# Patient Record
Sex: MALE | Race: WHITE | Employment: OTHER | URBAN - METROPOLITAN AREA
[De-identification: names, ages, dates, MRNs, and addresses within clinical notes are randomized per-mention and may not be internally consistent; named-entity substitution may affect disease eponyms.]

---

## 2019-03-17 ENCOUNTER — HOSPITAL ENCOUNTER (EMERGENCY)
Age: 74
Discharge: HOME | End: 2019-03-17
Attending: EMERGENCY MEDICINE
Payer: MEDICARE

## 2019-03-17 VITALS
HEART RATE: 61 BPM | DIASTOLIC BLOOD PRESSURE: 90 MMHG | RESPIRATION RATE: 16 BRPM | BODY MASS INDEX: 28.04 KG/M2 | SYSTOLIC BLOOD PRESSURE: 142 MMHG | OXYGEN SATURATION: 100 % | HEIGHT: 72 IN | WEIGHT: 207 LBS | TEMPERATURE: 98.8 F

## 2019-03-17 DIAGNOSIS — Z71.1 WORRIED WELL: Primary | ICD-10-CM

## 2019-03-17 LAB
GLUCOSE BLD-MCNC: 104 MG/DL (ref 70–99)
PERFORMED ON: ABNORMAL

## 2019-03-17 PROCEDURE — 93005 ELECTROCARDIOGRAM TRACING: CPT | Performed by: EMERGENCY MEDICINE

## 2019-03-17 PROCEDURE — 99283 EMERGENCY DEPT VISIT LOW MDM: CPT

## 2019-03-17 ASSESSMENT — ENCOUNTER SYMPTOMS
VOMITING: 0
SHORTNESS OF BREATH: 0
BACK PAIN: 0
RHINORRHEA: 0
DIARRHEA: 0
COUGH: 0
ABDOMINAL PAIN: 0
NAUSEA: 0

## 2019-03-18 LAB
EKG ATRIAL RATE: 57 BPM
EKG DIAGNOSIS: NORMAL
EKG P AXIS: 46 DEGREES
EKG P-R INTERVAL: 196 MS
EKG Q-T INTERVAL: 394 MS
EKG QRS DURATION: 122 MS
EKG QTC CALCULATION (BAZETT): 383 MS
EKG R AXIS: -46 DEGREES
EKG T AXIS: 42 DEGREES
EKG VENTRICULAR RATE: 57 BPM

## 2020-11-25 ENCOUNTER — APPOINTMENT (OUTPATIENT)
Dept: CT IMAGING | Age: 75
End: 2020-11-25
Payer: MEDICARE

## 2020-11-25 ENCOUNTER — APPOINTMENT (OUTPATIENT)
Dept: GENERAL RADIOLOGY | Age: 75
End: 2020-11-25
Payer: MEDICARE

## 2020-11-25 ENCOUNTER — HOSPITAL ENCOUNTER (EMERGENCY)
Age: 75
Discharge: HOME | End: 2020-11-25
Attending: EMERGENCY MEDICINE
Payer: MEDICARE

## 2020-11-25 VITALS
OXYGEN SATURATION: 98 % | DIASTOLIC BLOOD PRESSURE: 72 MMHG | HEART RATE: 72 BPM | TEMPERATURE: 97.4 F | RESPIRATION RATE: 18 BRPM | SYSTOLIC BLOOD PRESSURE: 127 MMHG

## 2020-11-25 PROCEDURE — 99282 EMERGENCY DEPT VISIT SF MDM: CPT

## 2020-11-25 PROCEDURE — 12052 INTMD RPR FACE/MM 2.6-5.0 CM: CPT

## 2020-11-25 PROCEDURE — 70450 CT HEAD/BRAIN W/O DYE: CPT

## 2020-11-25 PROCEDURE — 73560 X-RAY EXAM OF KNEE 1 OR 2: CPT

## 2020-11-25 PROCEDURE — 2500000003 HC RX 250 WO HCPCS: Performed by: EMERGENCY MEDICINE

## 2020-11-25 RX ORDER — LIDOCAINE HYDROCHLORIDE AND EPINEPHRINE 10; 10 MG/ML; UG/ML
20 INJECTION, SOLUTION INFILTRATION; PERINEURAL ONCE
Status: COMPLETED | OUTPATIENT
Start: 2020-11-25 | End: 2020-11-25

## 2020-11-25 RX ADMIN — LIDOCAINE HYDROCHLORIDE,EPINEPHRINE BITARTRATE 20 ML: 10; .01 INJECTION, SOLUTION INFILTRATION; PERINEURAL at 12:50

## 2020-11-25 ASSESSMENT — PAIN SCALES - GENERAL: PAINLEVEL_OUTOF10: 4

## 2020-11-25 NOTE — ED NOTES
Bed: B21-21  Expected date:   Expected time:   Means of arrival:   Comments:  Nora Cole RN  11/25/20 3931

## 2020-11-25 NOTE — ED PROVIDER NOTES
[address] [facility]          ATTENDING PHYSICIAN NOTE       Date of evaluation: 11/25/2020    Chief Complaint     Laceration and Fall      History of Present Illness     Kimmy Malagon is a 66 y.o. male who presents to the emergency department with a complaint of a laceration after a fall. Patient stated that he tripped and fell into the edge of a table or wall. Sustained a laceration above his left eye and also injured his left knee. This occurred immediately prior to arrival he denies any loss of vision or blurry vision out of his bilateral eyes. He denies any recent syncopal episodes. Only complaining of moderate pain in the left forehead and over the left knee that sharp nonradiating otherwise without any complaints at this time. Review of Systems     As documented in the HPI, otherwise all other systems were reviewed and were negative. Past Medical, Surgical, Family, and Social History     He has a past medical history of Aneurysm ([address] Bradycardia, Hypertension, and Thyroid disease. He has a past surgical history that includes Tonsillectomy and adenoidectomy. His family history is not on file. He reports that he has quit smoking. He has quit using smokeless tobacco. He reports current alcohol use of about 7.0 standard drinks of alcohol per week. He reports that he does not use drugs. Medications     Previous Medications    ASPIRIN 81 MG EC TABLET    Take 81 mg by mouth daily. CELECOXIB (CELEBREX) 200 MG CAPSULE    Take 200 mg by mouth daily. CHOLECALCIFEROL (VITAMIN D3) 56439 UNITS CAPS    Take 1 capsule by mouth once a week. DILTIAZEM (CARDIZEM CD) 180 MG ER CAPSULE    Take 180 mg by mouth daily. LEVOTHYROXINE (SYNTHROID) 150 MCG TABLET    Take 150 mcg by mouth daily. SILDENAFIL (VIAGRA) 100 MG TABLET    Take 100 mg by mouth as needed for Erectile Dysfunction. Allergies     He is allergic to propofol.     Physical Exam     INITIAL Intermediate  Pre-procedure details:     Preparation:  Patient was prepped and draped in usual sterile fashion  Exploration:     Wound exploration: wound explored through full range of motion and entire depth of wound probed and visualized      Wound extent: muscle damage      Contaminated: no    Treatment:     Area cleansed with:  Saline    Amount of cleaning:  Standard    Irrigation solution:  Sterile saline    Irrigation method:  Pressure wash    Visualized foreign bodies/material removed: no    Fascia repair:     Suture size:  5-0    Suture material:  Vicryl    Suture technique:  Simple interrupted    Number of sutures:  3  Subcutaneous repair:     Suture size:  6-0    Suture material:  Vicryl    Number of sutures:  3  Skin repair:     Repair method:  Sutures    Suture size:  6-0    Suture material:  Plain gut    Number of sutures:  8  Approximation:     Approximation:  Close  Post-procedure details:     Dressing:  Open (no dressing)    Patient tolerance of procedure: Tolerated well, no immediate complications          ED Course     Nursing Notes, Past Medical Hx, Past Surgical Hx, Social Hx, Allergies, and Family Hx were reviewed. The patient was given the following medications:  Orders Placed This Encounter   Medications    lidocaine-EPINEPHrine 1 percent-1:[id number] injection 20 mL       CONSULTS:  None    MEDICAL DECISION MAKING / ASSESSMENT / aMrissa Victor is a 66 y.o. male who presented to the emergency department after a fall sustaining a facial laceration which was repaired as described above. Given the patient's age a CT scan of the head was performed to evaluate for the possibility of intracranial hemorrhage. This was done and has resulted back showing no evidence of any intracranial hemorrhage or mass-effect on tertiary examination the patient also had some left knee pain and and left knee contusion noted. X-ray of the left knee showed no evidence of fracture.   The patient was felt safe for discharge home with instructions to have sutures removed if they remain past 7 days otherwise to keep the wound clean and dry and to return to the emergency department for any new or worsening symptoms. Clinical Impression     1. Facial laceration, initial encounter    2. Closed head injury, initial encounter    3.  Contusion of left knee, initial encounter        Disposition     PATIENT REFERRED TO:  aYquelin Nguyen MD  [address] [place] [address]  [phone]            DISCHARGE MEDICATIONS:  New Prescriptions    No medications on file       DISPOSITION Decision To Discharge 11/25/2020 01:37:55 PM         Amanda Berry MD  11/25/20 [id number]

## 2023-01-06 ENCOUNTER — APPOINTMENT (OUTPATIENT)
Dept: GENERAL RADIOLOGY | Age: 78
End: 2023-01-06
Payer: MEDICARE

## 2023-01-06 ENCOUNTER — HOSPITAL ENCOUNTER (EMERGENCY)
Age: 78
Discharge: HOME | End: 2023-01-06
Attending: EMERGENCY MEDICINE
Payer: MEDICARE

## 2023-01-06 ENCOUNTER — APPOINTMENT (OUTPATIENT)
Dept: CT IMAGING | Age: 78
End: 2023-01-06
Payer: MEDICARE

## 2023-01-06 VITALS
SYSTOLIC BLOOD PRESSURE: 135 MMHG | OXYGEN SATURATION: 97 % | DIASTOLIC BLOOD PRESSURE: 85 MMHG | HEIGHT: 72 IN | RESPIRATION RATE: 16 BRPM | BODY MASS INDEX: 31.83 KG/M2 | WEIGHT: 235 LBS | HEART RATE: 54 BPM | TEMPERATURE: 98.2 F

## 2023-01-06 DIAGNOSIS — R55 PRE-SYNCOPE: Primary | ICD-10-CM

## 2023-01-06 LAB
ANION GAP SERPL CALCULATED.3IONS-SCNC: 12 MMOL/L (ref 3–16)
BASOPHILS ABSOLUTE: 0 K/UL (ref 0–0.2)
BASOPHILS RELATIVE PERCENT: 0.7 %
BUN BLDV-MCNC: 16 MG/DL (ref 7–20)
CALCIUM SERPL-MCNC: 9.1 MG/DL (ref 8.3–10.6)
CHLORIDE BLD-SCNC: 102 MMOL/L (ref 99–110)
CO2: 24 MMOL/L (ref 21–32)
CREAT SERPL-MCNC: 0.9 MG/DL (ref 0.8–1.3)
EOSINOPHILS ABSOLUTE: 0.3 K/UL (ref 0–0.6)
EOSINOPHILS RELATIVE PERCENT: 3.5 %
GFR SERPL CREATININE-BSD FRML MDRD: >60 ML/MIN/{1.73_M2}
GLUCOSE BLD-MCNC: 86 MG/DL (ref 70–99)
HCT VFR BLD CALC: 43.7 % (ref 40.5–52.5)
HEMOGLOBIN: 14.9 G/DL (ref 13.5–17.5)
LYMPHOCYTES ABSOLUTE: 1.3 K/UL (ref 1–5.1)
LYMPHOCYTES RELATIVE PERCENT: 18.5 %
MCH RBC QN AUTO: 31.4 PG (ref 26–34)
MCHC RBC AUTO-ENTMCNC: 34.1 G/DL (ref 31–36)
MCV RBC AUTO: 92.2 FL (ref 80–100)
MONOCYTES ABSOLUTE: 0.5 K/UL (ref 0–1.3)
MONOCYTES RELATIVE PERCENT: 7.5 %
NEUTROPHILS ABSOLUTE: 5 K/UL (ref 1.7–7.7)
NEUTROPHILS RELATIVE PERCENT: 69.8 %
PDW BLD-RTO: 13.8 % (ref 12.4–15.4)
PLATELET # BLD: 298 K/UL (ref 135–450)
PMV BLD AUTO: 6.7 FL (ref 5–10.5)
POTASSIUM REFLEX MAGNESIUM: 4 MMOL/L (ref 3.5–5.1)
RBC # BLD: 4.74 M/UL (ref 4.2–5.9)
SODIUM BLD-SCNC: 138 MMOL/L (ref 136–145)
TROPONIN: <0.01 NG/ML
TSH SERPL DL<=0.05 MIU/L-ACNC: 4.52 UIU/ML (ref 0.27–4.2)
WBC # BLD: 7.2 K/UL (ref 4–11)

## 2023-01-06 PROCEDURE — 99285 EMERGENCY DEPT VISIT HI MDM: CPT

## 2023-01-06 PROCEDURE — 70450 CT HEAD/BRAIN W/O DYE: CPT

## 2023-01-06 PROCEDURE — 85025 COMPLETE CBC W/AUTO DIFF WBC: CPT

## 2023-01-06 PROCEDURE — 84443 ASSAY THYROID STIM HORMONE: CPT

## 2023-01-06 PROCEDURE — 84439 ASSAY OF FREE THYROXINE: CPT

## 2023-01-06 PROCEDURE — 71045 X-RAY EXAM CHEST 1 VIEW: CPT

## 2023-01-06 PROCEDURE — 84484 ASSAY OF TROPONIN QUANT: CPT

## 2023-01-06 PROCEDURE — 80048 BASIC METABOLIC PNL TOTAL CA: CPT

## 2023-01-06 ASSESSMENT — PAIN - FUNCTIONAL ASSESSMENT: PAIN_FUNCTIONAL_ASSESSMENT: NONE - DENIES PAIN

## 2023-01-06 NOTE — ED PROVIDER NOTES
[address] [facility] RESIDENT NOTE       Date of evaluation: 1/6/2023    Chief Complaint     Other (Pt states while he was driving \"something felt like it was taking over my whole being and that everything was shutting down\" that lasted about five seconds. Pt feels back to normal at time of triage. Pt denies having any dizziness, vision changes, numbness/tingling, SOB or CP. Resident at bedside during triage.)      History of Present Illness     Ailyn Milian is a 76 y.o. male with PMH significant for hypothyroidism on Synthroid, hypertension on diltiazem who presents for evaluation of presyncope. Patient reports that he was driving approximately 30 minutes prior to arrival, \"felt like something was taking over my whole being\". He was able to pull over, did not fully lose consciousness, however felt close, otherwise denies any chest pain, shortness of breath, palpitations preceding to the incident. He notes that this lasted for approximately 5 seconds, is now completely back to baseline, asymptomatic at this time. He has had no recent medication changes. He is concerned about a brain aneurysm which was diagnosed 2013, does have history of visual migraines, however denies any headache, visual changes, numbness, tingling, weakness around this episode. Other than stated above, no additional aggravating or alleviating factors are noted. MEDICAL DECISION MAKING / ASSESSMENT / PLAN     INITIAL VITALS: BP: (!) 159/84, Temp: 98.2 °F (36.8 °C), Heart Rate: 62, Resp: 20, SpO2: 100 %    Ailyn Milian is a 76 y.o. male with a history and presentation as described above in HPI. The patient was evaluated by myself and the ED Attending Physician, Dr. Nakul Castillo. All management and disposition plans were discussed and agreed upon. Upon presentation, the patient was well-appearing, afebrile and bradycardic, otherwise hemodynamically stable.     Medical Decision Making  The patient is a 77-year-old male with past medical significant for hypertension, brain aneurysm who presents for 5-second episode of presyncope. He is now asymptomatic with a nonfocal neurological exam.  He has sinus bradycardia on his EKG in the setting of diltiazem use which is similar to previous. CBC without leukocytosis or anemia present. BMP without sodium/potassium abnormalities or RENA present. Troponin within normal limits without ischemic changes on EKG. [place] syncope score calculated, zero, given blood pressure, QRS, lack of valvular disease, A. Fib, QT.  CT head noncontrast performed due to history of brain aneurysm, otherwise without symptoms consistent with subarachnoid hemorrhage, unremarkable. Patient remained asymptomatic here in the emergency department. Will follow-up with PCP, return if any worsening symptoms. TSH, T4 pending will follow-up results with PCP. Problems Addressed:  Pre-syncope: acute illness or injury    Amount and/or Complexity of Data Reviewed  Labs: ordered. Decision-making details documented in ED Course. Radiology: ordered. Decision-making details documented in ED Course. ECG/medicine tests: ordered. This patient was also evaluated by the attending physician. All care plans werediscussed and agreed upon. Clinical Impression     1. Pre-syncope        Disposition     PATIENT REFERRED TO:  Valeria Hendrix MD  [address] [place] [address]  [phone]    Schedule an appointment as soon as possible for a visit       DISCHARGE MEDICATIONS:  New Prescriptions    No medications on file       DISPOSITION Decision To Discharge 01/06/2023 01:44:23 PM        Diagnostic Results and Other Data     RADIOLOGY:  CT Head W/O Contrast   Final Result   1. No evidence for acute intracranial process. No bleed or shift   2. Mild periventricular chronic leukoencephalopathy, stable from prior CT November 25, 2020.       XR CHEST PORTABLE   Final Result Atelectatic changes right lower lung          LABS:   Results for orders placed or performed during the hospital encounter of 01/06/23   CBC with Auto Differential   Result Value Ref Range    WBC 7.2 4.0 - 11.0 K/uL    RBC 4.74 4.20 - 5.90 M/uL    Hemoglobin 14.9 13.5 - 17.5 g/dL    Hematocrit 43.7 40.5 - 52.5 %    MCV 92.2 80.0 - 100.0 fL    MCH 31.4 26.0 - 34.0 pg    MCHC 34.1 31.0 - 36.0 g/dL    RDW 13.8 12.4 - 15.4 %    Platelets 820 047 - 825 K/uL    MPV 6.7 5.0 - 10.5 fL    Neutrophils % 69.8 %    Lymphocytes % 18.5 %    Monocytes % 7.5 %    Eosinophils % 3.5 %    Basophils % 0.7 %    Neutrophils Absolute 5.0 1.7 - 7.7 K/uL    Lymphocytes Absolute 1.3 1.0 - 5.1 K/uL    Monocytes Absolute 0.5 0.0 - 1.3 K/uL    Eosinophils Absolute 0.3 0.0 - 0.6 K/uL    Basophils Absolute 0.0 0.0 - 0.2 K/uL   BMP w/ Reflex to MG   Result Value Ref Range    Sodium 138 136 - 145 mmol/L    Potassium reflex Magnesium 4.0 3.5 - 5.1 mmol/L    Chloride 102 99 - 110 mmol/L    CO2 24 21 - 32 mmol/L    Anion Gap 12 3 - 16    Glucose 86 70 - 99 mg/dL    BUN 16 7 - 20 mg/dL    Creatinine 0.9 0.8 - 1.3 mg/dL    Est, Glom Filt Rate >60 >60    Calcium 9.1 8.3 - 10.6 mg/dL   Troponin   Result Value Ref Range    Troponin <0.01 <0.01 ng/mL     EKG   Interpreted in conjunction with emergencydepartment physician Anna Hannah MD  Rhythm: normal sinus   Rate: bradycardia  Axis: normal  Ectopy: none  Conduction: normal  ST Segments: no acute change  T Waves:no acute change  Q Waves: none  Clinical Impression: no acute changes  Comparison:  3/17/19    ED BEDSIDE ULTRASOUND:  No results found. RECENT VITALS:  BP: (!) 159/84, Temp: 98.2 °F (36.8 °C), Heart Rate: 62,Resp: 20, SpO2: 100 %     Procedures         ED Course     Nursing Notes, Past Medical Hx, Past Surgical Hx, Social Hx, Allergies, and Family Hx were reviewed.          The patient was given the following medications:  No orders of the defined types were placed in this encounter. CONSULTS:  None    Review of Systems     ROS as stated above, all other systems reviewed and are negative. Past Medical, Surgical, Family, and Social History     He has a past medical history of Aneurysm ([address] Bradycardia, Hypertension, and Thyroid disease. He has a past surgical history that includes Tonsillectomy and adenoidectomy. His family history is not on file. He reports that he has quit smoking. He has quit using smokeless tobacco. He reports current alcohol use of about 7.0 standard drinks per week. He reports that he does not use drugs. Medications     Previous Medications    ASPIRIN 81 MG EC TABLET    Take 81 mg by mouth daily. CELECOXIB (CELEBREX) 200 MG CAPSULE    Take 200 mg by mouth daily. CHOLECALCIFEROL (VITAMIN D3) 73288 UNITS CAPS    Take 1 capsule by mouth once a week. DILTIAZEM (CARDIZEM CD) 180 MG ER CAPSULE    Take 180 mg by mouth daily. LEVOTHYROXINE (SYNTHROID) 150 MCG TABLET    Take 150 mcg by mouth daily. SILDENAFIL (VIAGRA) 100 MG TABLET    Take 100 mg by mouth as needed for Erectile Dysfunction. Allergies     He is allergic to propofol. Physical Exam     INITIAL VITALS: BP: (!) 159/84, Temp: 98.2 °F (36.8 °C), Heart Rate: 62, Resp: 20, SpO2: 100 %   Physical Exam    General:  Well-appearing. NAD  Eyes:  Pupils reactive. No discharge from eyes   ENT:  No discharge from nose. OP clear  Neck:  Supple, trachea midline  Pulmonary:   Non-labored breathing. Breath sounds clear bilaterally  Cardiac:  Regular rate and rhythm. No murmurs  Abdomen:  Soft. Non-tender. Non-distended  Musculoskeletal:  No long bone deformity. No CVA tenderness  Vascular:  Extremities warm and perfused. Skin:  Dry, no rashes  Neuro:  Alert and oriented x3. Moves all four extremities to command. No focal deficit  CN II-XII intact. Sensation grossly intact to light touch.  Speech and mentation normal.  Psych: Appropriate mood and affect           Patito Rankin MD  Resident  01/06/23 [address] [place] [address], MD  Resident  01/06/23 [phone]

## 2023-01-06 NOTE — ED PROVIDER NOTES
ED Attending Attestation Note     Date of evaluation: 1/6/2023    This patient was seen by the resident. I have seen and examined the patient, agree with the workup, evaluation, management and diagnosis. The care plan has been discussed. I have reviewed the ECG and concur with the resident's interpretation. My assessment reveals a 64-year-old male presenting to the emergency department with a complaint of a sensation of dysphoria that occurred while he was driving earlier today. On physical examination cranial nerves II through XII are intact intact strength in bilateral upper and lower extremities throughout sensation tact light touch in bilateral upper and lower extremities throughout.      Emily Martinez MD  01/06/23 1022

## 2023-01-07 LAB — T4 FREE: 1.3 NG/DL (ref 0.9–1.8)

## 2023-04-17 ENCOUNTER — HOSPITAL ENCOUNTER (EMERGENCY)
Age: 78
Discharge: HOME | End: 2023-04-17
Attending: EMERGENCY MEDICINE
Payer: MEDICARE

## 2023-04-17 VITALS
OXYGEN SATURATION: 98 % | BODY MASS INDEX: 29.8 KG/M2 | WEIGHT: 220 LBS | HEIGHT: 72 IN | RESPIRATION RATE: 20 BRPM | TEMPERATURE: 98.2 F | SYSTOLIC BLOOD PRESSURE: 143 MMHG | HEART RATE: 103 BPM | DIASTOLIC BLOOD PRESSURE: 97 MMHG

## 2023-04-17 DIAGNOSIS — K62.5 BRIGHT RED BLOOD PER RECTUM: Primary | ICD-10-CM

## 2023-04-17 LAB
ANION GAP SERPL CALCULATED.3IONS-SCNC: 13 MMOL/L (ref 3–16)
BASOPHILS # BLD: 0 K/UL (ref 0–0.2)
BASOPHILS NFR BLD: 0.6 %
BUN SERPL-MCNC: 19 MG/DL (ref 7–20)
CALCIUM SERPL-MCNC: 8.7 MG/DL (ref 8.3–10.6)
CHLORIDE SERPL-SCNC: 105 MMOL/L (ref 99–110)
CO2 SERPL-SCNC: 20 MMOL/L (ref 21–32)
CREAT SERPL-MCNC: 1 MG/DL (ref 0.8–1.3)
DEPRECATED RDW RBC AUTO: 14.1 % (ref 12.4–15.4)
EOSINOPHIL # BLD: 0.1 K/UL (ref 0–0.6)
EOSINOPHIL NFR BLD: 1.9 %
GFR SERPLBLD CREATININE-BSD FMLA CKD-EPI: >60 ML/MIN/{1.73_M2}
GLUCOSE SERPL-MCNC: 123 MG/DL (ref 70–99)
HCT VFR BLD AUTO: 41.9 % (ref 40.5–52.5)
HGB BLD-MCNC: 14.3 G/DL (ref 13.5–17.5)
LYMPHOCYTES # BLD: 1.3 K/UL (ref 1–5.1)
LYMPHOCYTES NFR BLD: 16.7 %
MCH RBC QN AUTO: 31.6 PG (ref 26–34)
MCHC RBC AUTO-ENTMCNC: 34.1 G/DL (ref 31–36)
MCV RBC AUTO: 92.6 FL (ref 80–100)
MONOCYTES # BLD: 0.5 K/UL (ref 0–1.3)
MONOCYTES NFR BLD: 6.6 %
NEUTROPHILS # BLD: 5.8 K/UL (ref 1.7–7.7)
NEUTROPHILS NFR BLD: 74.2 %
PLATELET # BLD AUTO: 386 K/UL (ref 135–450)
PMV BLD AUTO: 6.7 FL (ref 5–10.5)
POTASSIUM SERPL-SCNC: 4.4 MMOL/L (ref 3.5–5.1)
RBC # BLD AUTO: 4.53 M/UL (ref 4.2–5.9)
SODIUM SERPL-SCNC: 138 MMOL/L (ref 136–145)
WBC # BLD AUTO: 7.9 K/UL (ref 4–11)

## 2023-04-17 PROCEDURE — 85025 COMPLETE CBC W/AUTO DIFF WBC: CPT

## 2023-04-17 PROCEDURE — 99283 EMERGENCY DEPT VISIT LOW MDM: CPT

## 2023-04-17 PROCEDURE — 80048 BASIC METABOLIC PNL TOTAL CA: CPT

## 2023-04-17 ASSESSMENT — PAIN - FUNCTIONAL ASSESSMENT
PAIN_FUNCTIONAL_ASSESSMENT: NONE - DENIES PAIN
PAIN_FUNCTIONAL_ASSESSMENT: NONE - DENIES PAIN

## 2023-04-17 NOTE — ED PROVIDER NOTES
[address] [facility]          ATTENDING PHYSICIAN NOTE       Date of evaluation: 4/17/2023    Chief Complaint     GI Bleeding (Pt had rectal bleeding several weeks ago and saw GI. Bleeding stopped. Is scheduled for a colonoscopy today at 1230, but had rectal bleeding starting last night. GI on call sent him to ED for evaluation)      History of Present Illness     Jamar Gar is a 76 y.o. male with bright red blood per rectum. Patient is scheduled for colonoscopy at 1230 today he is undergoing colonoscopy prep last night when he began to have some bright red blood at approximately 0530. He was scheduled for colonoscopy due to similar bright red blood seen approximate 3 weeks ago that had since resolved. He denies abdominal pain or lightheadedness. States the bleeding has again resolved on further bowel movements. Has had prior colonoscopy with polyp removal and has extended past his 5-year recommended return visit. He is currently on baby aspirin which she did not take this morning, no other anticoagulation. Physical Exam     INITIAL VITALS: BP: (!) 143/97, Temp: 98.2 °F (36.8 °C), Heart Rate: (!) 103, Resp: 20, SpO2: 98 %     Physical Exam    Nursing note and vitals reviewed. General:  Adult male, alert and appropriately interactive. In no distress. HENT: Normocephalic and atraumatic. External ears normal. Nose appears normal externally. Eyes: Conjunctivae normal. No scleral icterus. Neck: Neck supple. No tracheal deviation present. CV: Normal rate. Regular rhythm. Pulm: Effort normal on room air. GI: Soft. No distension. No tenderness. No rebound or guarding. No masses. No peritoneal signs. Rectal: Scant brown stool noted on digital exam. No masses or external lesions. Musculoskeletal: No edema. No gross deformities. Neurological: Alert and appropriately interactive. Face symmetric, speech without dysarthria or obvious aphasia.  Moving all

## 2023-04-17 NOTE — DISCHARGE INSTRUCTIONS
Go to your colonoscopy appointment as scheduled for further evaluation. Your blood counts looked improved today.

## 2024-10-09 ENCOUNTER — HOSPITAL ENCOUNTER (EMERGENCY)
Age: 79
Discharge: HOME | End: 2024-10-09
Attending: EMERGENCY MEDICINE
Payer: MEDICARE

## 2024-10-09 VITALS
TEMPERATURE: 97.9 F | DIASTOLIC BLOOD PRESSURE: 72 MMHG | BODY MASS INDEX: 25.15 KG/M2 | WEIGHT: 196 LBS | SYSTOLIC BLOOD PRESSURE: 125 MMHG | HEART RATE: 65 BPM | RESPIRATION RATE: 16 BRPM | OXYGEN SATURATION: 99 % | HEIGHT: 74 IN

## 2024-10-09 DIAGNOSIS — R33.8 ACUTE URINARY RETENTION: Primary | ICD-10-CM

## 2024-10-09 DIAGNOSIS — N40.1 BPH WITH URINARY OBSTRUCTION: ICD-10-CM

## 2024-10-09 DIAGNOSIS — N30.01 ACUTE CYSTITIS WITH HEMATURIA: ICD-10-CM

## 2024-10-09 DIAGNOSIS — N13.8 BPH WITH URINARY OBSTRUCTION: ICD-10-CM

## 2024-10-09 LAB
BACTERIA URNS QL MICRO: ABNORMAL /HPF
BILIRUB UR QL STRIP.AUTO: NEGATIVE
CLARITY UR: CLEAR
COLOR UR: YELLOW
GLUCOSE UR STRIP.AUTO-MCNC: NEGATIVE MG/DL
HGB UR QL STRIP.AUTO: ABNORMAL
KETONES UR STRIP.AUTO-MCNC: NEGATIVE MG/DL
LEUKOCYTE ESTERASE UR QL STRIP.AUTO: ABNORMAL
NITRITE UR QL STRIP.AUTO: NEGATIVE
PH UR STRIP.AUTO: 6 [PH] (ref 5–8)
PROT UR STRIP.AUTO-MCNC: 100 MG/DL
RBC #/AREA URNS HPF: >100 /HPF (ref 0–4)
SP GR UR STRIP.AUTO: >=1.03 (ref 1–1.03)
UA DIPSTICK W REFLEX MICRO PNL UR: YES
URN SPEC COLLECT METH UR: ABNORMAL
UROBILINOGEN UR STRIP-ACNC: 0.2 E.U./DL
WBC #/AREA URNS HPF: ABNORMAL /HPF (ref 0–5)

## 2024-10-09 PROCEDURE — 87086 URINE CULTURE/COLONY COUNT: CPT

## 2024-10-09 PROCEDURE — 87186 SC STD MICRODIL/AGAR DIL: CPT

## 2024-10-09 PROCEDURE — 51798 US URINE CAPACITY MEASURE: CPT

## 2024-10-09 PROCEDURE — 99283 EMERGENCY DEPT VISIT LOW MDM: CPT

## 2024-10-09 PROCEDURE — 51702 INSERT TEMP BLADDER CATH: CPT

## 2024-10-09 PROCEDURE — 81001 URINALYSIS AUTO W/SCOPE: CPT

## 2024-10-09 PROCEDURE — 87088 URINE BACTERIA CULTURE: CPT

## 2024-10-09 RX ORDER — CEPHALEXIN 500 MG/1
500 CAPSULE ORAL 4 TIMES DAILY
Qty: 28 CAPSULE | Refills: 0 | Status: SHIPPED | OUTPATIENT
Start: 2024-10-09 | End: 2024-10-16

## 2024-10-09 ASSESSMENT — PAIN SCALES - GENERAL: PAINLEVEL_OUTOF10: 7

## 2024-10-09 ASSESSMENT — PAIN DESCRIPTION - LOCATION: LOCATION: ABDOMEN

## 2024-10-09 NOTE — ED PROVIDER NOTES
THE [facility]  EMERGENCY DEPARTMENT ENCOUNTER          ATTENDING PHYSICIAN NOTE       Date of evaluation: 10/9/2024    Chief Complaint     Urinary Retention (Pt states he has not been able to urinate since  today. States this has happened before after drinking Whiskey a few years ago. )      History of Present Illness     Larry Licea is a 78 y.o. male with a history of BPH, generally healthy otherwise, who presents to the emergency department with a complaint of urinary retention.  The patient states that he had a small amount of whiskey to drink last night at around 11 PM, and has not been able to urinate since.  He does state that he has had 1 similar prior episode when he had a little bit of whiskey, and was unable to urinate for about 6 hours.  He knows that he has BPH, follows with urology for this, and is on appropriate medications.  He does recall that he had postoperative urinary retention after hip replacement some years ago, and needed to wear a leg bag for his Lara catheter for a couple of weeks after the surgery.  Aside from this, he has never required placement of a Lara catheter for urinary retention.  Patient endorses a sensation that he needs to urinate, but denies any significant abdominal pain.  Denies any back pain.  Denies any nausea or vomiting, changes in bowel habits.  Denies chest pain, shortness of breath.  Denies fevers or chills.    Review of Systems     Review of Systems   All other systems reviewed and are negative.      Past Medical, Surgical, Family, and Social History     He has a past medical history of Aneurysm (HCC), Bradycardia, Hypertension, and Thyroid disease.  He has a past surgical history that includes Tonsillectomy and adenoidectomy.  His family history is not on file.  He reports that he has quit smoking. He has quit using smokeless tobacco. He reports current alcohol use of about 7.0 standard drinks of alcohol per week. He reports that he does not use

## 2024-10-09 NOTE — ED PROVIDER NOTES
THE [facility]  EMERGENCY DEPARTMENT ENCOUNTER          EM ATTENDING NOTE     Date of evaluation: 10/9/2024    ADDENDUM:      Care of this patient was assumed from Dr. Santizo.  The patient was seen for Urinary Retention (Pt states he has not been able to urinate since  today. States this has happened before after drinking Whiskey a few years ago. )  .  The patient's initial evaluation and plan have been discussed with the prior provider who initially evaluated the patient.  Nursing Notes, Past Medical Hx, Past Surgical Hx, Social Hx, Allergies, and Family Hx were all reviewed.    Diagnostic Results       RADIOLOGY:  No orders to display       LABS:   Results for orders placed or performed during the hospital encounter of 10/09/24   Urinalysis with Microscopic   Result Value Ref Range    Color, UA Yellow Straw/Yellow    Clarity, UA Clear Clear    Glucose, Ur Negative Negative mg/dL    Bilirubin, Urine Negative Negative    Ketones, Urine Negative Negative mg/dL    Specific Gravity, UA >=1.030 1.005 - 1.030    Blood, Urine LARGE (A) Negative    pH, Urine 6.0 5.0 - 8.0    Protein,  (A) Negative mg/dL    Urobilinogen, Urine 0.2 <2.0 E.U./dL    Nitrite, Urine Negative Negative    Leukocyte Esterase, Urine MODERATE (A) Negative    Microscopic Examination YES     Urine Type NotGiven     WBC, UA 21-50 (A) 0 - 5 /HPF    RBC, UA >100 (A) 0 - 4 /HPF    Bacteria, UA 3+ (A) None Seen /HPF       RECENT VITALS:  BP: 137/88, Temp: 97.9 °F (36.6 °C), Pulse: 62, Respirations: 16, SpO2: 98 %     Procedures       ED Course     The patient was given the following medications:  No orders of the defined types were placed in this encounter.      CONSULTS:  None    MEDICAL DECISION MAKING / ASSESSMENT / PLAN     Larry Licea is a 78 y.o. male presenting with concerns for acute urinary retention.  Patient has history of BPH with intermittent urinary retention in the past, provoked by alcohol use.  Patient reports having some

## 2024-10-09 NOTE — DISCHARGE INSTRUCTIONS
We saw you in the hospital for urinary retention. Your urine testing also shows evidence of a urinary tract infection.    You were treated with pedraza catheter.     You need to take a pill called keflex when you go home. We have provided you a prescription for it, please fill it at the pharmacy and take it as written on the bottle. If you have questions about how to take the medicine, please be sure to ask the pharmacist when you pick it up.    You need to see your urologist in 1 week for re-evaluation.    You should return to the emergency department if your symptoms worsen or do not resolve. In addition, return if:  - You have a fever (greater than 101 degrees)  - You have chest pain, shortness of breath, abdominal pain, or uncontrollable vomiting  - You are unable to eat or drink  - You pass out  - You have difficulty moving your arms or legs   - You have difficulty speaking or slurred speech  - Or you have any concern that you feel needs immediate physician evaluation.

## 2024-10-11 LAB
BACTERIA UR CULT: ABNORMAL
ORGANISM: ABNORMAL

## 2024-10-11 NOTE — ED NOTES
Leg bag attached to catheter, instructions given and patient verbalized understanding.      Tiffany Shields, RN  10/09/24 0969    
days).    Recommendation    It is recommended that the patient continue empiric antibiotic (cephalexin) prescribed at time of ED encounter and continue taking until therapy completed. Antibiotic may be taken with or without food and should be taken with a full glass of water with each dose. If nausea/vomiting occurs with antibiotic administration, recommend taking antibiotic with food to increase tolerability.  Patient will be contacted and counseled to continue empiric antibiotic at this time as well as instructed when to seek additional medical attention if symptoms persist or worsen.     Follow-Up    Patient reached and provided with updated lab/culture results as well as follow-up counseling at listed number in chart on 10/11/2024 at 12:12PM.     Thank you,  Taiwo Hill, PharmD  PGY1 Pharmacy Resident   Wireless: [phone]  10/11/24